# Patient Record
Sex: FEMALE | Race: BLACK OR AFRICAN AMERICAN | NOT HISPANIC OR LATINO | URBAN - METROPOLITAN AREA
[De-identification: names, ages, dates, MRNs, and addresses within clinical notes are randomized per-mention and may not be internally consistent; named-entity substitution may affect disease eponyms.]

---

## 2022-07-20 ENCOUNTER — WEB ENCOUNTER (OUTPATIENT)
Dept: URBAN - METROPOLITAN AREA CLINIC 17 | Facility: CLINIC | Age: 47
End: 2022-07-20

## 2022-07-22 ENCOUNTER — OFFICE VISIT (OUTPATIENT)
Dept: URBAN - METROPOLITAN AREA CLINIC 17 | Facility: CLINIC | Age: 47
End: 2022-07-22
Payer: COMMERCIAL

## 2022-07-22 ENCOUNTER — DASHBOARD ENCOUNTERS (OUTPATIENT)
Age: 47
End: 2022-07-22

## 2022-07-22 ENCOUNTER — WEB ENCOUNTER (OUTPATIENT)
Dept: URBAN - METROPOLITAN AREA CLINIC 17 | Facility: CLINIC | Age: 47
End: 2022-07-22

## 2022-07-22 VITALS
DIASTOLIC BLOOD PRESSURE: 103 MMHG | HEIGHT: 60 IN | TEMPERATURE: 97.6 F | HEART RATE: 75 BPM | WEIGHT: 194.8 LBS | BODY MASS INDEX: 38.24 KG/M2 | SYSTOLIC BLOOD PRESSURE: 159 MMHG

## 2022-07-22 DIAGNOSIS — Z90.3 S/P GASTRIC SLEEVE PROCEDURE: ICD-10-CM

## 2022-07-22 DIAGNOSIS — K57.30 DIVERTICULAR DISEASE OF COLON: ICD-10-CM

## 2022-07-22 DIAGNOSIS — K21.00 GASTROESOPHAGEAL REFLUX DISEASE WITH ESOPHAGITIS WITHOUT HEMORRHAGE: ICD-10-CM

## 2022-07-22 DIAGNOSIS — K59.01 CONSTIPATION BY DELAYED COLONIC TRANSIT: ICD-10-CM

## 2022-07-22 DIAGNOSIS — Z86.010 PERSONAL HISTORY OF COLONIC POLYPS: ICD-10-CM

## 2022-07-22 PROBLEM — 733657002: Status: ACTIVE | Noted: 2022-07-22

## 2022-07-22 PROBLEM — 266433003: Status: ACTIVE | Noted: 2022-07-22

## 2022-07-22 PROBLEM — 428283002: Status: ACTIVE | Noted: 2022-07-22

## 2022-07-22 PROCEDURE — 99204 OFFICE O/P NEW MOD 45 MIN: CPT | Performed by: INTERNAL MEDICINE

## 2022-07-22 PROCEDURE — 99244 OFF/OP CNSLTJ NEW/EST MOD 40: CPT | Performed by: INTERNAL MEDICINE

## 2022-07-22 RX ORDER — LOSARTAN POTASSIUM AND HYDROCHLOROTHIAZIDE 50; 12.5 MG/1; MG/1
TAKE 1 TABLET BY ORAL ROUTE ONCE DAILY TABLET, FILM COATED ORAL 1
Qty: 0 | Refills: 0 | Status: ON HOLD | COMMUNITY
Start: 1900-01-01

## 2022-07-22 RX ORDER — LINACLOTIDE 145 UG/1
TAKE ONE CAPSULE BY MOUTH EVERY DAY CAPSULE, GELATIN COATED ORAL
Qty: 30 | Refills: 5 | Status: ON HOLD | COMMUNITY
Start: 2017-01-14

## 2022-07-22 RX ORDER — LINACLOTIDE 145 UG/1
TAKE ONE CAPSULE BY MOUTH CAPSULE, GELATIN COATED ORAL BID
Qty: 180 | Refills: 3
Start: 2017-01-14 | End: 2023-07-17

## 2022-07-22 RX ORDER — IBUPROFEN 200 MG
TABLET ORAL
Qty: 0 | Refills: 0 | Status: ON HOLD | COMMUNITY
Start: 1900-01-01

## 2022-07-22 RX ORDER — OMEPRAZOLE 40 MG/1
TAKE ONE CAPSULE BY MOUTH EVERY DAY CAPSULE, DELAYED RELEASE PELLETS ORAL
Qty: 30 | Refills: 3 | Status: ON HOLD | COMMUNITY
Start: 2016-10-29

## 2022-07-22 NOTE — HPI-TODAY'S VISIT:
The patient with history of obesity s/p gastric sleeve, GERD, constipation, and colon polyps who presents for a screening colonoscopy. The pt has right hip pain and she has noted increased abdominal distention with central obesity. The pt notes that she has struggled with constipation for several years.  She notes that she has not been able to exercise or eat healthier choices over time.  The patient's consultation note will be sent to the referring MD, Dr. Estela Rojas.

## 2022-07-25 PROBLEM — 35298007: Status: ACTIVE | Noted: 2022-07-22

## 2022-08-02 ENCOUNTER — OFFICE VISIT (OUTPATIENT)
Dept: URBAN - METROPOLITAN AREA SURGERY CENTER 16 | Facility: SURGERY CENTER | Age: 47
End: 2022-08-02
Payer: COMMERCIAL

## 2022-08-02 DIAGNOSIS — Z86.010 ADENOMAS PERSONAL HISTORY OF COLONIC POLYPS: ICD-10-CM

## 2022-08-02 PROCEDURE — G0105 COLORECTAL SCRN; HI RISK IND: HCPCS | Performed by: INTERNAL MEDICINE

## 2022-08-02 PROCEDURE — G8907 PT DOC NO EVENTS ON DISCHARG: HCPCS | Performed by: INTERNAL MEDICINE

## 2022-08-02 RX ORDER — LINACLOTIDE 145 UG/1
TAKE ONE CAPSULE BY MOUTH CAPSULE, GELATIN COATED ORAL BID
Qty: 180 | Refills: 3 | Status: ACTIVE | COMMUNITY
Start: 2017-01-14 | End: 2023-07-17

## 2022-08-02 RX ORDER — OMEPRAZOLE 40 MG/1
TAKE ONE CAPSULE BY MOUTH EVERY DAY CAPSULE, DELAYED RELEASE PELLETS ORAL
Qty: 30 | Refills: 3 | Status: ON HOLD | COMMUNITY
Start: 2016-10-29

## 2022-08-02 RX ORDER — IBUPROFEN 200 MG
TABLET ORAL
Qty: 0 | Refills: 0 | Status: ON HOLD | COMMUNITY
Start: 1900-01-01

## 2022-08-02 RX ORDER — LOSARTAN POTASSIUM AND HYDROCHLOROTHIAZIDE 50; 12.5 MG/1; MG/1
TAKE 1 TABLET BY ORAL ROUTE ONCE DAILY TABLET, FILM COATED ORAL 1
Qty: 0 | Refills: 0 | Status: ON HOLD | COMMUNITY
Start: 1900-01-01